# Patient Record
Sex: FEMALE | Race: WHITE
[De-identification: names, ages, dates, MRNs, and addresses within clinical notes are randomized per-mention and may not be internally consistent; named-entity substitution may affect disease eponyms.]

---

## 2022-08-11 ENCOUNTER — HOSPITAL ENCOUNTER (OUTPATIENT)
Dept: HOSPITAL 75 - PREOP | Age: 72
LOS: 5 days | Discharge: HOME | End: 2022-08-16
Attending: ORTHOPAEDIC SURGERY
Payer: MEDICARE

## 2022-08-11 VITALS — HEIGHT: 66.02 IN | BODY MASS INDEX: 20.94 KG/M2 | WEIGHT: 130.29 LBS

## 2022-08-11 DIAGNOSIS — Z01.818: Primary | ICD-10-CM

## 2022-08-11 NOTE — HISTORY AND PHYSICAL
DATE OF SERVICE:  



This will be for outpatient surgery on 08/17/2022 for left wrist hardware

removal.



HISTORY OF PRESENT ILLNESS:

The patient is a 71-year-old right hand dominant female who underwent internal

fixation for left distal radius fracture at an outside facility on or about

06/12/2022.  No medical records were available.  When she presented to the

office, she still had her stitches in.  She had been placed in the cast.  The

radiographs reveal a wrist spanning dorsal plate.  It was recommended the

patient undergo surgical removal because this is joint spanning.



REVIEW OF SYSTEMS:

No chest pain, no shortness of breath, no dysuria.



PAST MEDICAL HISTORY:

Bipolar, tremor, weakness, Alzheimer's, AFib, vitamin D deficiency, anxiety

disorder, major depressive disorder, pain, nausea, dementia, hyperlipidemia.



PAST SURGICAL HISTORY:

Left wrist.



SOCIAL HISTORY:

No alcohol or tobacco use.



FAMILY HISTORY:

Noncontributory.



PRIMARY CARE PROVIDER:

Dr. Chacon.



ALLERGIES:

PENICILLIN AND TYLENOL.



PHYSICAL EXAMINATION:

GENERAL:  The patient is well-developed, well-nourished, in no acute distress.

HEENT:  Normocephalic, atraumatic.  Pupils are equal, round and reactive to

light.  Oropharynx is clear.

NECK:  Supple, no lymphadenopathy.

LUNGS:  Clear to auscultation bilaterally.

HEART:  Regular rate and rhythm.

ABDOMEN:  Soft, nontender, nondistended.

EXTREMITIES:  The left wrist demonstrates well-healed incisions.  She has intact

MCP extension, finger abduction, thumb IP flexion and extension.  Radiographs

reveal markedly comminuted, shortened left distal radius fracture with

well-placed hardware.



IMPRESSION:

Retained hardware, left wrist.



PLAN:

Hardware removal, left wrist.  The risks, benefits, options, ramifications and

recovery were discussed with the patient and her son.  They understand and

wished to proceed.





Job ID: [id number]

DocumentID: [id number]

Dictated Date:  08/01/2022 14:02:46

Transcription Date: 08/01/2022 14:44:45

Dictated By: TARUN HARDWICK MD

## 2022-08-17 ENCOUNTER — HOSPITAL ENCOUNTER (OUTPATIENT)
Dept: HOSPITAL 75 - SDC | Age: 72
Discharge: HOME | End: 2022-08-17
Attending: ORTHOPAEDIC SURGERY
Payer: MEDICARE

## 2022-08-17 VITALS — SYSTOLIC BLOOD PRESSURE: 133 MMHG | DIASTOLIC BLOOD PRESSURE: 86 MMHG

## 2022-08-17 VITALS — SYSTOLIC BLOOD PRESSURE: 138 MMHG | DIASTOLIC BLOOD PRESSURE: 90 MMHG

## 2022-08-17 VITALS — SYSTOLIC BLOOD PRESSURE: 120 MMHG | DIASTOLIC BLOOD PRESSURE: 77 MMHG

## 2022-08-17 VITALS — SYSTOLIC BLOOD PRESSURE: 121 MMHG | DIASTOLIC BLOOD PRESSURE: 72 MMHG

## 2022-08-17 VITALS — SYSTOLIC BLOOD PRESSURE: 154 MMHG | DIASTOLIC BLOOD PRESSURE: 91 MMHG

## 2022-08-17 VITALS — DIASTOLIC BLOOD PRESSURE: 77 MMHG | SYSTOLIC BLOOD PRESSURE: 120 MMHG

## 2022-08-17 VITALS — HEIGHT: 67.01 IN | WEIGHT: 130.29 LBS | BODY MASS INDEX: 20.45 KG/M2

## 2022-08-17 VITALS — SYSTOLIC BLOOD PRESSURE: 152 MMHG | DIASTOLIC BLOOD PRESSURE: 83 MMHG

## 2022-08-17 VITALS — DIASTOLIC BLOOD PRESSURE: 80 MMHG | SYSTOLIC BLOOD PRESSURE: 154 MMHG

## 2022-08-17 VITALS — DIASTOLIC BLOOD PRESSURE: 83 MMHG | SYSTOLIC BLOOD PRESSURE: 151 MMHG

## 2022-08-17 VITALS — DIASTOLIC BLOOD PRESSURE: 89 MMHG | SYSTOLIC BLOOD PRESSURE: 143 MMHG

## 2022-08-17 VITALS — SYSTOLIC BLOOD PRESSURE: 150 MMHG | DIASTOLIC BLOOD PRESSURE: 86 MMHG

## 2022-08-17 VITALS — SYSTOLIC BLOOD PRESSURE: 105 MMHG | DIASTOLIC BLOOD PRESSURE: 66 MMHG

## 2022-08-17 DIAGNOSIS — Z47.2: Primary | ICD-10-CM

## 2022-08-17 PROCEDURE — 87081 CULTURE SCREEN ONLY: CPT

## 2022-08-17 NOTE — PROGRESS NOTE-POST OPERATIVE
Post-Operative Progess Note


Surgeon (s)/Assistant (s)


Surgeon


TARUN HARDWICK MD


Assistant:  Colby Jalloh





Pre-Operative Diagnosis


retained left wrist hardware





Post-Operative Diagnosis





retained left wrist hardware





Procedure & Operative Findings


Date of Procedure


8/17/22


Procedure Performed/Findings


hardware removal left wrist


Anesthesia Type


GETA





Estimated Blood Loss


Estimated blood loss (mL):  minimal





Specimens/Packing


Specimens Removed


plate and screws


Packing:  


none











TARUN HARDWICK MD            Aug 17, 2022 10:16

## 2022-08-17 NOTE — PROGRESS NOTE-PRE OPERATIVE
Pre-Operative Progress Note


Date of Available H&P:  Aug 17, 2022


Date H&P Reviewed:  Aug 17, 2022


Time H&P Reviewed:  07:11


Changes from last HP


none


Pre-Operative Diagnosis:  retained left wrist hardware











TARUN HARDWICK MD            Aug 17, 2022 10:15

## 2022-08-17 NOTE — OPERATIVE REPORT
DATE OF SERVICE:  08/17/2022



PREOPERATIVE DIAGNOSIS:

Symptomatic retained left wrist hardware.



POSTOPERATIVE DIAGNOSIS:

Symptomatic retained left wrist hardware.



PROCEDURE:

Hardware removal, left wrist.



SURGEON:

Alexis Hardwick MD



ASSISTANT:

Colby Jalloh, who assisted throughout the procedure and closed the incisions.



ANESTHESIA:

General endotracheal by Myla Riggins CRNA.



TOURNIQUET TIME:

A 20 minutes at 250 mmHg.



ESTIMATED BLOOD LOSS:

Minimal.



DRAINS:

None.



COMPLICATIONS:

None.



POSTOPERATIVE PLAN:

Splint wear for activities for two weeks.  The patient was transferred to the

recovery room awake and in stable condition.



STATEMENT OF MEDICAL NECESSITY:

The patient is a 71-year-old right hand dominant female who underwent a wrist

spanning plate and screw fixation for distal radius fracture in [place].  She

was moved to a nursing home locally and followed up with us.  The fracture has

gone on to union and because this was a spanning implant, removal was required

in order to progress her activities.



DESCRIPTION OF PROCEDURE:

After risks and benefits of procedure were discussed and questions were

answered, informed consent was signed and placed on chart.  The operative site

was confirmed in the preoperative holding area initialed by the surgeon.  The

patient was then transferred to the operating room and after adequate levels of

general endotracheal anesthetic were obtained, a timeout was called, confirming

the operative site.  Left upper extremity was prepped and draped in the usual

sterile fashion with arm elevated, the tourniquet was inflated to 250 mmHg.  The

previous incisions were utilized over the index metacarpal and distal radius. 

The underlying soft tissues were carefully dissected.  The plate was exposed,

and screws were removed without difficulty.  The plate was then removed without

difficulty.  The wrist was taken through range of motion.  Wounds were copiously

irrigated and closed with 4-0 nylon in horizontal mattress interrupted fashion. 

Incisions were infiltrated with plain Marcaine.  A soft dressing and splint were

applied, and the patient was transferred to the recovery room awake and in

stable condition.





Job ID: [id number]

DocumentID: [id number]

Dictated Date:  08/17/2022 12:40:50

Transcription Date: 08/17/2022 19:09:19

Dictated By: ALEXIS HARDWICK MD

## 2022-08-17 NOTE — ANESTHESIA-GENERAL POST-OP
General


Patient Condition


Mental Status/LOC:  Same as Preop


Cardiovascular:  Satisfactory


Nausea/Vomiting:  Absent


Respiratory:  Satisfactory


Pain:  Controlled


Complications:  Absent





Post Op Complications


Complications


None





Follow Up Care/Instructions


Patient Instructions


None needed.





Anesthesia/Patient Condition


Patient Condition


Patient is doing well, no complaints, stable vital signs, no apparent adverse 

anesthesia problems.   


No complications reported per nursing.











KRISTI BAUTISTA CRNA            Aug 17, 2022 12:39

## 2022-09-18 ENCOUNTER — HOSPITAL ENCOUNTER (EMERGENCY)
Dept: HOSPITAL 75 - ER | Age: 72
Discharge: HOME | End: 2022-09-18
Payer: MEDICARE

## 2022-09-18 VITALS — HEIGHT: 66.93 IN | BODY MASS INDEX: 19.38 KG/M2 | WEIGHT: 123.46 LBS

## 2022-09-18 VITALS — SYSTOLIC BLOOD PRESSURE: 116 MMHG | DIASTOLIC BLOOD PRESSURE: 74 MMHG

## 2022-09-18 DIAGNOSIS — Z28.310: ICD-10-CM

## 2022-09-18 DIAGNOSIS — S30.0XXA: Primary | ICD-10-CM

## 2022-09-18 DIAGNOSIS — Z87.891: ICD-10-CM
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
## 2022-09-18 PROCEDURE — 73521 X-RAY EXAM HIPS BI 2 VIEWS: CPT

## 2022-09-18 NOTE — DIAGNOSTIC IMAGING REPORT
HISTORY: 

Fall, bilateral hip pain.



TECHNIQUE: 

Frontal view of the pelvis. 

Frontal and lateral views of the bilateral hips.



COMPARISON: 

None.



FINDINGS:

No acute fracture or dislocation is seen in the pelvis or

bilateral hips. Alignment appears normal. Joint spaces appear

preserved. There is marked stool in the rectum.



IMPRESSION:

1. No acute osseous abnormality is seen in the pelvis or

bilateral hips.

2. Marked stool in the rectum.



Dictated by: 



  Dictated on workstation # [id number]

## 2022-09-18 NOTE — ED GENERAL
General


Chief Complaint:  Trauma-Non Activation


Stated Complaint:  FALL


Nursing Triage Note:  


ARRIVED VIA EMS FROM [facility] AFTER FALLING.  COMPLAINS OF TAILBONE, 


RIGHT HIP, THIGH, AND KNEE PAIN.


Source of Information:  Patient, Nursing Home Records


Exam Limitations:  Other (Pt has dementia so hx is limited)


 (BINDU LEWIS A MED STUDENT)





History of Present Illness


Date Seen by Provider:  Sep 18, 2022


Time Seen by Provider:  08:49


Initial Comments


Amberly Patiño is a 70 yo female who was brought in via EMS from [facility] after falling. Pt has hx of alzheimers, anxiety, MDD, weakness/debility 

and repeated falls. Pt's history is obtained from [facility] records as 

patient is only alert and oriented to self and place. EMS reported caretakers 

stated the pt fell out of bed this morning. Pt reports she thinks she fell on 

her right hip or buttock. She denies hitting her head or losing consciousness. 

Upon entering room, pt is tearful and states she is "scared" and she is crying 

because this is "silly". Pt states her right hip hurts upon palpation, but at 

rest she reports she is in no pain. She declines pain medication. According to 

nursing home notes she walks with FWW and gait belt with assistance. Unable to 

obtain ROS as pt has hx of dementia and is a poor historian. She does deny any 

other symptoms such as numbness, weakness, urinary frequency or pain, N/V/D.


Timing/Duration:  1-3 Hours


Associated Systoms:  Denies Symptoms (BINDU LEWIS A MED STUDENT)





Allergies and Home Medications


Allergies


Coded Allergies:  


     Penicillins (Verified  Allergy, Unknown, 22)


     acetaminophen (Verified  Allergy, Unknown, 22)





Patient Home Medication List


Home Medication List Reviewed:  Yes


 (YONY COBB MD)


Aspirin (Aspirin) 81 Mg Tab.chew, 81 MG PO DAILY, (Reported)


   Entered as Reported by: GUNNER PEÑA on 22 142


Citalopram Hydrobromide (Celexa) 20 Mg Tablet, 20 MG PO HS, (Reported)


   Entered as Reported by: GUNNER PEÑA on 22 1424


Clonazepam (Clonazepam) 0.25 Mg Tab.rapdis, 0.5 MG PO HS, (Reported)


   Entered as Reported by: GUNNER PEÑA on 22 1602


Melatonin (Melatonin) 3 Mg Capsule, 3 MG PO HS, (Reported)


   Entered as Reported by: GUNNER PEÑA on 22 1424


Mv-Mn/Iron/FA/Herbal Cmplx#190 (Vitamin D3 Complete Caplet) 18 Mg Iron-800 Mcg-

150 Mg Tablet, 1 EACH PO DAILY, (Reported)


   Entered as Reported by: GUNNER PEÑA on 22 1424


Olanzapine (Olanzapine) 5 Mg Tablet, 5 MG PO HS, (Reported)


   Entered as Reported by: GUNNER PEÑA on 22 1424


Ondansetron HCl (Ondansetron HCl) 8 Mg Tablet, 8 MG PO Q8H, (Reported)


   Entered as Reported by: GUNNER PEÑA on 22 1424


Thiamine Mononitrate (Vitamin B-1) 100 Mg Tablet, 100 MG PO DAILY, (Reported)


   Entered as Reported by: GUNNER PEÑA on 22 1424





Review of Systems


Review of Systems


Unable to obtain d/t dementia


 (BINDU LEWIS STUDENT)





Past Medical-Social-Family Hx


Patient Social History


Smoking Status:  Former Smoker


Substance use?:  No


Alcohol Use?:  No


 (BELEW,BINDU A MED STUDENT)





Immunizations Up To Date


First/Initial COVID19 Vaccinat:  YES


 (BINDU LEWIS STUDENT)





Seasonal Allergies


Seasonal Allergies:  No


 (BINDU LEWIS)





Past Medical History


Surgeries:  Yes (LEFT WRIST HARDWARE PLACEMENT, UNKNOWN IF OTHER SX)


Respiratory:  No


Currently Using CPAP:  No


Currently Using BIPAP:  No


Cardiac:  Yes


Atrial Fibrillation, High Cholesterol


Neurological:  Yes (ALZHEIMERS, TREMOR, COGNATIVE COMMNICATION DEFICIT)


Dementia


Genitourinary:  No


Gastrointestinal:  Yes (ARTIFICAL GI TRACCT, NAUSEA)


Musculoskeletal:  Yes (WEAKNESS, DIFFICULTY WALKING)


Fractures


Endocrine:  No


HEENT:  No (UNKNOWN)


Cancer:  No (UNKNOWN)


Psychosocial:  Yes (DEPRESSIVE DISORDER,)


Anxiety


Integumentary:  No


 (BELEW,BINDU A MED STUDENT)





Physical Exam


Vital Signs





Vital Signs - First Documented








 22





 08:33


 


Temp 36.6


 


Pulse 79


 


Resp 16


 


B/P (MAP) 112/69 (83)


 


Pulse Ox 95


 


O2 Delivery Room Air





 (YONY COBB MD)


Vital Signs


Capillary Refill : Less Than 3 Seconds 


 (BINDU LEWIS MED STUDENT)


Height, Weight, BMI


Height: '"


Weight: lbs. oz. kg; 19.00 BMI


Method:


 


 (BINDU LEWIS STUDENT)


General Appearance:  Anxious (tearful), Thin


Eyes:  Bilateral Eye Normal Inspection, Bilateral Eye PERRL, Bilateral Eye EOMI


HEENT:  PERRL/EOMI


Neck:  Full Range of Motion, Non Tender


Respiratory:  Lungs Clear, Normal Breath Sounds, No Accessory Muscle Use, No 

Respiratory Distress


Cardiovascular:  Regular Rate, Rhythm, Normal Peripheral Pulses


Gastrointestinal:  Non Tender, Soft (YONY COBB MD)





Progress/Results/Core Measures


Suspected Sepsis


SIRS


Temperature: 


Pulse: 79 


Respiratory Rate: 16


 


Blood Pressure 112 /69 


Mean: 83


 


 (BINDU LEWIS STUDENT)





Results/Orders


My Orders





Orders - YONY COBB MD


Pelvis/Tammie Hips 2 Views (22 08:48)


Hydrocodone/Apap 7.5/325 Tab (Lortab 7. (22 09:00)


 (YONY COBB MD)


Medications Given in ED





Current Medications








 Medications  Dose


 Ordered  Sig/Rolando


 Route  Start Time


 Stop Time Status Last Admin


Dose Admin


 


 Acetaminophen/


 Hydrocodone Bitart  1 ea  ONCE  ONCE


 PO  22 09:00


 22 09:02 DC 22 09:04


1 EA





 (YONY COBB MD)


Vital Signs/I&O











 22





 08:33


 


Temp 36.6


 


Pulse 79


 


Resp 16


 


B/P (MAP) 112/69 (83)


 


Pulse Ox 95


 


O2 Delivery Room Air





 (YONY COBB MD)


Vital Signs/I&O


Capillary Refill : Less Than 3 Seconds 


 (BINDU LEWIS MED STUDENT)


2





Blood Pressure Mean:                    83











Diagnostic Imaging





   Diagonstic Imaging:  Xray


Comments


                 [facility] [place], [place]





NAME:   AMBERLY PATIÑO


MED REC#:   [id number]


ACCOUNT#:   [id number]


PT STATUS:   REG ER


:   1950


PHYSICIAN:   YONY COBB MD


ADMIT DATE:   22/ER


                                   ***Draft***


Date of Exam:22





PELVIS/TAMMIE HIPS 2 VIEWS








HISTORY: 


Fall, bilateral hip pain.





TECHNIQUE: 


Frontal view of the pelvis. 


Frontal and lateral views of the bilateral hips.





COMPARISON: 


None.





FINDINGS:


No acute fracture or dislocation is seen in the pelvis or


bilateral hips. Alignment appears normal. Joint spaces appear


preserved. There is marked stool in the rectum.





IMPRESSION:


1. No acute osseous abnormality is seen in the pelvis or


bilateral hips.


2. Marked stool in the rectum.





  Dictated on workstation # [id number]








Dict:   22 1004


Trans:   22 1012


 [id number]





Interpreted by:     DIANA TRIMBLE MD


Electronically signed by:  


 (YONY COBB MD)





Departure


Impression





   Primary Impression:  


   Fall


   Qualified Codes:  W19.XXXA - Unspecified fall, initial encounter


   Additional Impression:  


   Pelvic contusion


   Qualified Codes:  S30.0XXA - Contusion of lower back and pelvis, initial 

   encounter


Disposition:  01 HOME, SELF-CARE


Condition:  Stable





Departure-Patient Inst.


Decision time for Depature:  10:25


 (YONY COBB MD)


Referrals:  


UZMA CROSS MD (PCP/Family)


Primary Care Physician


Patient Instructions:  Minor Contusion ED





Add. Discharge Instructions:  


No hip or pelvic fracture was discovered today.  





Continue routine medications as prescribed.





Monitor for increasing weakness.  Fall prevention is important - she needs 

assist with ambulation.





Return to the emergency department for any new, concerning or emergent complain

ts





Copy


Copies To 1:   UZMA CROSS MD, [place] A MED STUDENT    Sep 18, 2022 08:56


YONY COBB MD         Sep 18, 2022 10:09

## 2022-10-02 ENCOUNTER — HOSPITAL ENCOUNTER (EMERGENCY)
Dept: HOSPITAL 75 - ER | Age: 72
Discharge: HOME | End: 2022-10-02
Payer: MEDICARE

## 2022-10-02 VITALS — BODY MASS INDEX: 21.11 KG/M2 | WEIGHT: 134.48 LBS | HEIGHT: 66.93 IN

## 2022-10-02 VITALS — DIASTOLIC BLOOD PRESSURE: 78 MMHG | SYSTOLIC BLOOD PRESSURE: 128 MMHG
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
## 2022-10-02 DIAGNOSIS — S01.81XA: ICD-10-CM

## 2022-10-02 DIAGNOSIS — S09.90XA: Primary | ICD-10-CM

## 2022-10-02 DIAGNOSIS — Z23: ICD-10-CM

## 2022-10-02 DIAGNOSIS — Z87.891: ICD-10-CM

## 2022-10-02 PROCEDURE — 70450 CT HEAD/BRAIN W/O DYE: CPT

## 2022-10-02 PROCEDURE — 12011 RPR F/E/E/N/L/M 2.5 CM/<: CPT

## 2022-10-02 PROCEDURE — 72125 CT NECK SPINE W/O DYE: CPT

## 2022-10-02 PROCEDURE — 90715 TDAP VACCINE 7 YRS/> IM: CPT

## 2022-10-02 NOTE — DIAGNOSTIC IMAGING REPORT
PROCEDURE: CT head and CT cervical spine without contrast.



TECHNIQUE: Multiple contiguous axial images were obtained through

the brain and cervical spine without the use of intravenous

contrast. Sagittal and coronal reformations through the cervical

spine were then performed. Auto Exposure Controls were utilized

during the CT exam to meet ALARA standards for radiation dose

reduction. 



INDICATION: Fall. Head pain. Laceration above right eye.



COMPARISON: CT head September 18, 2021.



FINDINGS: There is age-related global volume loss. There are

background microvascular changes present within the white matter.

There is no finding of acute intracranial hemorrhage. There is no

evidence of an abnormal extra-axial collection. There is no mass

effect or shift. There is no hydrocephalus. The basilar cisterns

are patent.



There is no finding of territorial loss of gray-white

differentiation. There is no vasogenic edema.



There is no CT finding of a calvarial fracture. The mastoids are

clear. The paranasal sinuses are clear. The orbital contents are

unremarkable. There is no identified facial fracture.



Cervical spine demonstrates reversal of the cervical lordosis.

Alignment is normal. There are normal relationships of the

craniocervical junction. There are normal relationships of the

lateral masses of C1 and C2. The facets are normally aligned.

There is no facet joint or disc space widening. Vertebral body

heights are maintained. There is no finding of an acute cervical

spine fracture. There is no evidence of high-grade canal

stenosis.



The lung apices demonstrate features of paraseptal emphysema.

There is a markedly enlarged left sided thyroid which appears

multinodular. There is no acute soft tissue abnormality.



IMPRESSION:

1. Age-related volume loss with background microvascular changes

within the white matter. There is no CT finding of intracranial

hemorrhage or of an acute intracranial abnormality.

2. No identified facial or calvarial fracture. Orbital contents

unremarkable.

3. No CT finding of cervical spine fracture or traumatic

malalignment.

4. Paraseptal emphysema.

5. Marked enlargement of a multinodular left lobe of the thyroid.



Dictated by: 



  Dictated on workstation # [id number]

## 2022-10-02 NOTE — ED FALL/INJURY
General


Chief Complaint:  Laceration


Stated Complaint:  FALL


Nursing Triage Note:  


ARRIVED VIA EMS FROM FACILITY WITH COMPLAINTS OF FALLING AND CAUSING A 


LACERATION ABOVE HER RIGHT EYE.  DENIES LOC.  STATES SHE JUST TRIPPED.  EMS 


REPORTS SHE WAS NOT USING HER WALKER.





History of Present Illness


Date Seen by Provider:  Oct 2, 2022


Time Seen by Provider:  15:20


Initial Comments


Patient is a 71 yo F who presents to the ED via EMS after a mechanical fall at 

her assisted living facility. She states she was walking in front of the 

refrigerator when she slipped causing her to fall and strike the right side of 

her forehead/head on the ground. She denies any LOC. States her only pain is 

that in her head. EMS state patient typically uses a walker to assist with 

ambulation but was not using it at the time of the fall. Patient states she 

noticed blood coming from her forehead and she applied a rag to wound and held 

pressure. EMS states patient was stable en route. Patient is unsure of the date 

of her last tetanus booster.


Occurred:  just prior to arrival


Injuries/Pain Location:  head, face


Context:  slipped


Loss of Consciousness:  no loss of consciousness





Allergies and Home Medications


Allergies


Coded Allergies:  


     Penicillins (Verified  Allergy, Unknown, 8/16/22)


     acetaminophen (Verified  Allergy, Unknown, 8/16/22)





Patient Home Medication List


Home Medication List Reviewed:  Yes


Aspirin (Aspirin) 81 Mg Tab.chew, 81 MG PO DAILY, (Reported)


   Entered as Reported by: GUNNER PEÑA on 8/16/22 1424


Citalopram Hydrobromide (Celexa) 20 Mg Tablet, 20 MG PO HS, (Reported)


   Entered as Reported by: GUNNER PEÑA on 8/16/22 1424


Clonazepam (Clonazepam) 0.25 Mg Tab.rapdis, 0.5 MG PO HS, (Reported)


   Entered as Reported by: GUNNER PEÑA on 8/11/22 1602


Melatonin (Melatonin) 3 Mg Capsule, 3 MG PO HS, (Reported)


   Entered as Reported by: GUNNER PEÑA on 8/16/22 1424


Mv-Mn/Iron/FA/Herbal Cmplx#190 (Vitamin D3 Complete Caplet) 18 Mg Iron-800 Mcg-

150 Mg Tablet, 1 EACH PO DAILY, (Reported)


   Entered as Reported by: GUNNER PEÑA on 8/16/22 1424


Olanzapine (Olanzapine) 5 Mg Tablet, 5 MG PO HS, (Reported)


   Entered as Reported by: GUNNER PEÑA on 8/16/22 1424


Ondansetron HCl (Ondansetron HCl) 8 Mg Tablet, 8 MG PO Q8H, (Reported)


   Entered as Reported by: GUNNER PEÑA on 8/16/22 1424


Thiamine Mononitrate (Vitamin B-1) 100 Mg Tablet, 100 MG PO DAILY, (Reported)


   Entered as Reported by: GUNNER PEÑA on 8/16/22 1424





Review of Systems


Review of Systems


Constitutional:  no symptoms reported


Eyes:  No Symptoms Reported


Ears, Nose, Mouth, Throat:  no symptoms reported


Respiratory:  no symptoms reported


Cardiovascular:  no symptoms reported


Gastrointestinal:  no symptoms reported


Genitourinary:  no symptoms reported


Skin:  other (laceration)


Psychiatric/Neurological:  Headache





Past Medical-Social-Family Hx


Patient Social History


Tobacco Use?:  Yes


Smoking Status:  Former Smoker


Substance use?:  Yes


Substance frequency:  Rarely





Immunizations Up To Date


First/Initial COVID19 Vaccinat:  YES





Seasonal Allergies


Seasonal Allergies:  No





Past Medical History


Surgeries:  Yes (LEFT WRIST HARDWARE PLACEMENT, UNKNOWN IF OTHER SX)


Respiratory:  No


Currently Using CPAP:  No


Currently Using BIPAP:  No


Cardiac:  Yes


Atrial Fibrillation, High Cholesterol


Neurological:  Yes (ALZHEIMERS, TREMOR, COGNATIVE COMMNICATION DEFICIT)


Dementia


Genitourinary:  No


Gastrointestinal:  Yes (ARTIFICAL GI TRACCT, NAUSEA)


Musculoskeletal:  Yes (WEAKNESS, DIFFICULTY WALKING)


Fractures


Endocrine:  No


HEENT:  No (UNKNOWN)


Cancer:  No (UNKNOWN)


Psychosocial:  Yes (DEPRESSIVE DISORDER,)


Anxiety


Integumentary:  No





Physical Exam


Vital Signs





Vital Signs - First Documented








 10/2/22





 15:18


 


Temp 36.4


 


Pulse 91


 


Resp 16


 


B/P (MAP) 124/74 (91)


 


Pulse Ox 97


 


O2 Delivery Room Air





Capillary Refill : Less Than 3 Seconds


Height, Weight, BMI


Height: '"


Weight: lbs. oz. kg; 21.00 BMI


Method:


General Appearance:  WD/WN


HEENT:  PERRL/EOMI, normal ENT inspection, TMs normal


Neck:  non-tender, full range of motion, supple, normal inspection


Cardiovascular:  regular rate, rhythm


Respiratory:  chest non-tender, lungs clear, normal breath sounds, no respira

tory distress, no accessory muscle use


Gastrointestinal:  normal bowel sounds, non tender, soft


Extremities:  normal range of motion, non-tender, normal inspection


Neurologic/Psychiatric:  no motor/sensory deficits, alert, normal mood/affect


Skin:  normal color, warm/dry





[place] Coma Score


Best Eye Response:  (4) Open Spontaneously


Best Verbal Response:  (4) Confused Conversation


Best Motor Response:  (6) Obeys Commands


[place] Total:  14





Procedures/Interventions





   Wound Location:  Face


   Wound Length (cm):  1.5


   Wound's Depth, Shape:  superficial


   Wound Explored:  no foreign body removed


   Irrigated w/ Saline (ccs):  100


   Suture Size:  5-0


   Number of Sutures:  5


anesthesia with LET gel; 5 sutures of fast absorbing gut applied to the wound





Progress/Results/Core Measures


Results/Orders


My Orders





Orders - ANA PAULA BURKS


Ct Head/Cervical Spine Wo (10/2/22 15:26)


Let Solution (Let Solution) (10/2/22 15:30)


Dipht,Pertuss(Acell),Tet Adult (Boostrix (10/2/22 15:30)





Medications Given in ED





Current Medications








 Medications  Dose


 Ordered  Sig/Rolando


 Route  Start Time


 Stop Time Status Last Admin


Dose Admin


 


 Diphtheria/


 Tetanus/Acell


 Pertussis  0.5 ml  ONCE ONCE


 IM  10/2/22 15:30


 10/2/22 15:31 DC 10/2/22 16:19


0.5 ML


 


 Tetracaine/


 Epinephrine/


 Lidocaine  3 ml  ONCE  ONCE


 TOP  10/2/22 15:30


 10/2/22 15:31 DC 10/2/22 15:31


3 ML








Vital Signs/I&O











 10/2/22 10/2/22





 15:18 17:03


 


Temp 36.4 


 


Pulse 91 81


 


Resp 16 16


 


B/P (MAP) 124/74 (91) 128/78


 


Pulse Ox 97 99


 


O2 Delivery Room Air Room Air














Blood Pressure Mean:                    91











Progress


Progress Note :  


Progress Note


Patient is nontoxic and well hydrated on exam. No focal neurologic deficits 

appreciated. Vital signs are reassuring. Pt is alert to person and place. This 

is her baseline per EMS and family. CT of the head/c-spine was negative. Pt was 

placed in a c-collar by EMS and this was successfully removed. Laceration was 

repaired as noted separately. Tetanus was updated. Wound care was discussed. 

Follow-up with PCP as needed. Return precautions for urgent symptomology 

discussed. Patient and family verbalized understanding.





Departure


Impression





   Primary Impression:  


   Minor head injury without loss of consciousness


   Qualified Codes:  S09.90XA - Unspecified injury of head, initial encounter


   Additional Impressions:  


   Fall


   Qualified Codes:  W19.XXXA - Unspecified fall, initial encounter


   Forehead laceration


   Qualified Codes:  S01.81XA - Laceration without foreign body of other part of

   head, initial encounter


Disposition:  01 HOME, SELF-CARE


Condition:  Improved





Departure-Patient Inst.


Decision time for Depature:  16:50


Referrals:  


UZMA CROSS MD (PCP/Family)


Primary Care Physician


Patient Instructions:  Laceration Repair With Stitches (DC), Minor Head Injury 

(DC)











ANA PAULA BURKS              Oct 2, 2022 16:01

## 2023-02-01 ENCOUNTER — HOSPITAL ENCOUNTER (EMERGENCY)
Dept: HOSPITAL 75 - ER | Age: 73
Discharge: HOME | End: 2023-02-01
Payer: MEDICARE

## 2023-02-01 VITALS — SYSTOLIC BLOOD PRESSURE: 128 MMHG | DIASTOLIC BLOOD PRESSURE: 74 MMHG
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
## 2023-02-01 DIAGNOSIS — S50.11XA: Primary | ICD-10-CM

## 2023-02-01 PROCEDURE — 73090 X-RAY EXAM OF FOREARM: CPT

## 2023-02-01 NOTE — ED UPPER EXTREMITY
General


Chief Complaint:  Trauma-Non Activation


Stated Complaint:  FALL | RT ARM INJ


Source:  patient, caregiver


Exam Limitations:  no limitations





History of Present Illness


Date Seen by Provider:  Feb 1, 2023


Time Seen by Provider:  15:00


Initial Comments


Patient is a 72-year-old female who presents to the emergency department for 

evaluation of right arm pain after a mechanical fall in her kitchen.  Patient is

accompanied by a caregiver who helps provide history.  Patient denies any other 

pain or injury at this time.  She states she remembers the fall and denies any 

head injury or loss of consciousness.  The injury occurred approximately 1 hour 

prior to arrival.  Ice was placed to the injury shortly after it occurred.  

Patient has had no medications for the symptoms since they began.





Allergies and Home Medications


Allergies


Coded Allergies:  


     Penicillins (Verified  Allergy, Unknown, 8/16/22)


     acetaminophen (Verified  Allergy, Unknown, 8/16/22)





Patient Home Medication List


Home Medication List Reviewed:  Yes


Aspirin (Aspirin) 81 Mg Tab.chew, 81 MG PO DAILY, (Reported)


   Entered as Reported by: GUNNER PEÑA on 8/16/22 1424


Citalopram Hydrobromide (Celexa) 20 Mg Tablet, 20 MG PO HS, (Reported)


   Entered as Reported by: GUNNER PEÑA on 8/16/22 1424


Clonazepam (Clonazepam) 0.25 Mg Tab.rapdis, 0.5 MG PO HS, (Reported)


   Entered as Reported by: GUNNER PEÑA on 8/11/22 1602


Melatonin (Melatonin) 3 Mg Capsule, 3 MG PO HS, (Reported)


   Entered as Reported by: GUNNER PEÑA on 8/16/22 1424


Mv-Mn/Iron/FA/Herbal Cmplx#190 (Vitamin D3 Complete Caplet) 18 Mg Iron-800 Mcg-

150 Mg Tablet, 1 EACH PO DAILY, (Reported)


   Entered as Reported by: GUNNER PEÑA on 8/16/22 1424


Olanzapine (Olanzapine) 5 Mg Tablet, 5 MG PO HS, (Reported)


   Entered as Reported by: GUNNER PEÑA on 8/16/22 1424


Ondansetron HCl (Ondansetron HCl) 8 Mg Tablet, 8 MG PO Q8H, (Reported)


   Entered as Reported by: GUNNER PEÑA on 8/16/22 1424


Thiamine Mononitrate (Vitamin B-1) 100 Mg Tablet, 100 MG PO DAILY, (Reported)


   Entered as Reported by: GUNNER PEÑA on 8/16/22 9144





Review of Systems


Constitutional:  no symptoms reported


EENTM:  no symptoms reported


Respiratory:  no symptoms reported


Cardiovascular:  no symptoms reported


Gastrointestinal:  no symptoms reported


Genitourinary:  no symptoms reported


Musculoskeletal:  see HPI


Skin:  no symptoms reported


Psychiatric/Neurological:  No Symptoms Reported





Past Medical-Social-Family Hx


Immunizations Up To Date


First/Initial COVID19 Vaccinat:  YES





Seasonal Allergies


Seasonal Allergies:  No





Past Medical History


Surgeries:  Yes (LEFT WRIST HARDWARE PLACEMENT, UNKNOWN IF OTHER SX)


Respiratory:  No


Currently Using CPAP:  No


Currently Using BIPAP:  No


Cardiac:  Yes


Atrial Fibrillation, High Cholesterol


Neurological:  Yes (ALZHEIMERS, TREMOR, COGNATIVE COMMNICATION DEFICIT)


Dementia


Genitourinary:  No


Gastrointestinal:  Yes (ARTIFICAL GI TRACCT, NAUSEA)


Musculoskeletal:  Yes (WEAKNESS, DIFFICULTY WALKING)


Fractures


Endocrine:  No


HEENT:  No (UNKNOWN)


Cancer:  No (UNKNOWN)


Psychosocial:  Yes (DEPRESSIVE DISORDER,)


Anxiety


Integumentary:  No





Physical Exam


Vital Signs





Vital Signs - First Documented








 2/1/23





 14:54


 


Temp 36.0


 


Pulse 112


 


Resp 22


 


B/P (MAP) 140/77 (98)


 


Pulse Ox 96


 


O2 Delivery Room Air





Capillary Refill :


Height, Weight, BMI


Height: '"


Weight: lbs. oz. kg; 21.00 BMI


Method:


General Appearance:  WD/WN, no apparent distress


HEENT:  PERRL/EOMI, normal ENT inspection, TMs normal, pharynx normal


Neck:  non-tender, full range of motion, supple, normal inspection


Cardiovascular:  regular rate, rhythm


Respiratory:  chest non-tender, lungs clear, normal breath sounds, no 

respiratory distress, no accessory muscle use


Gastrointestinal:  normal bowel sounds, non tender, soft


Elbow/Forearm:  Right, pain, soft tissue tenderness, swelling





Procedures/Interventions





   Suture Size:  5-0





Progress/Results/Core Measures


Results/Orders


My Orders





Orders - ANA PAULA BURKS


Forearm, Right, 2 Views (2/1/23 14:58)





Vital Signs/I&O











 2/1/23





 14:54


 


Temp 36.0


 


Pulse 112


 


Resp 22


 


B/P (MAP) 140/77 (98)


 


Pulse Ox 96


 


O2 Delivery Room Air











Progress


Progress Note :  


Progress Note


Patient is nontoxic and well-hydrated on exam.  She was ambulatory to the room 

without issue.  She does have some swelling noted to the radial side of the mid 

forearm along with ecchymosis.  She is able to pronate and supinate the affected

extremity without provocation of pain.  Neurovascular function is intact distal 

to the injury.  Ulnar and radial pulse are strong and brisk.





Placed for x-ray of the right forearm.





No obvious osseous abnormality noted on my wet read of the x-ray.  Formal 

radiology report in agreement that there are no acute findings noted.  Will 

discharge home with recommendations for supportive care and follow-up with PCP. 

Return precautions for urgent symptomology discussed.  Patient and caregiver 

verbalized understanding.





Departure


Impression





   Primary Impression:  


   Contusion of right forearm


   Qualified Codes:  S50.11XA - Contusion of right forearm, initial encounter


Disposition:  01 HOME, SELF-CARE


Condition:  Stable





Departure-Patient Inst.


Decision time for Depature:  15:35


Referrals:  


DENISE OLEARY DO (PCP/Family)


Primary Care Physician


Patient Instructions:  Contusion (DC)











ANA PAULA BURKS              Feb 1, 2023 15:03

## 2023-02-01 NOTE — DIAGNOSTIC IMAGING REPORT
INDICATION: Fall with right forearm pain.



AP and lateral views of the right forearm are obtained.



No fracture or acute bony abnormality seen.



IMPRESSION:



Negative right forearm.



Dictated by: 



  Dictated on workstation # [id number]

## 2023-07-01 ENCOUNTER — HOSPITAL ENCOUNTER (EMERGENCY)
Dept: HOSPITAL 75 - ER | Age: 73
Discharge: HOME | End: 2023-07-01
Payer: MEDICARE

## 2023-07-01 VITALS — SYSTOLIC BLOOD PRESSURE: 127 MMHG | DIASTOLIC BLOOD PRESSURE: 70 MMHG

## 2023-07-01 VITALS — HEIGHT: 66.14 IN | WEIGHT: 135.8 LBS | BODY MASS INDEX: 21.83 KG/M2

## 2023-07-01 DIAGNOSIS — S60.042A: ICD-10-CM

## 2023-07-01 DIAGNOSIS — S06.2X0A: Primary | ICD-10-CM
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
## 2023-07-01 PROCEDURE — 70450 CT HEAD/BRAIN W/O DYE: CPT

## 2023-07-01 NOTE — ED FALL/INJURY
General


Chief Complaint:  Trauma-Non Activation


Stated Complaint:  FALL


Source:  patient, EMS


Exam Limitations:  no limitations





History of Present Illness


Date Seen by Provider:  Jul 1, 2023


Time Seen by Provider:  07:05


Initial Comments


72-year-old female presents to the emergency department today after a fall.  She

was at her nursing facility and had an unwitnessed fall.  She does not believe 

she lost consciousness.  She is not on blood thinning medications.  She got up 

and was able to ambulate without difficulty after the event.  She complains of 

pain only in her left head at the area of the bruise.





All other systems reviewed and negative except documented per HPI.





Voice recognition software was used to help create this chart





Allergies and Home Medications


Allergies


Coded Allergies:  


     Penicillins (Verified  Allergy, Unknown, 8/16/22)


     acetaminophen (Verified  Allergy, Unknown, 8/16/22)





Patient Home Medication List


Home Medication List Reviewed:  Yes


Aspirin (Aspirin) 81 Mg Tab.chew, 81 MG PO DAILY, (Reported)


   Entered as Reported by: GUNNER PEÑA on 8/16/22 1424


Citalopram Hydrobromide (Celexa) 20 Mg Tablet, 20 MG PO HS, (Reported)


   Entered as Reported by: GUNNER PEÑA on 8/16/22 1424


Clonazepam (Clonazepam) 0.25 Mg Tab.rapdis, 0.5 MG PO HS, (Reported)


   Entered as Reported by: GUNNER PEÑA on 8/11/22 1602


Melatonin (Melatonin) 3 Mg Capsule, 3 MG PO HS, (Reported)


   Entered as Reported by: GUNNER PEÑA on 8/16/22 1424


Mv-Mn/Iron/FA/Herbal Cmplx#190 (Vitamin D3 Complete Caplet) 18 Mg Iron-800 Mcg-

150 Mg Tablet, 1 EACH PO DAILY, (Reported)


   Entered as Reported by: GUNNER PEÑA on 8/16/22 1424


Olanzapine (Olanzapine) 5 Mg Tablet, 5 MG PO HS, (Reported)


   Entered as Reported by: GUNNER PEÑA on 8/16/22 1424


Ondansetron HCl (Ondansetron HCl) 8 Mg Tablet, 8 MG PO Q8H, (Reported)


   Entered as Reported by: GUNNER PEÑA on 8/16/22 1424


Thiamine Mononitrate (Vitamin B-1) 100 Mg Tablet, 100 MG PO DAILY, (Reported)


   Entered as Reported by: GUNNER PEÑA on 8/16/22 1424





Review of Systems


Review of Systems


Constitutional:  see HPI





Past Medical-Social-Family Hx


Patient Social History


Tobacco Use?:  No


Use of E-Cig and/or Vaping dev:  No


Substance use?:  No


Alcohol Use?:  No





Immunizations Up To Date


First/Initial COVID19 Vaccinat:  YES


Second COVID19 Vaccination Florentin:  YES


Third COVID19 Vaccination Date:  YES





Seasonal Allergies


Seasonal Allergies:  No





Past Medical History


Surgery/Hospitalization HX:  


afib


Surgeries:  Yes (LEFT WRIST HARDWARE PLACEMENT, UNKNOWN IF OTHER SX)


Respiratory:  No


Currently Using CPAP:  No


Currently Using BIPAP:  No


Cardiac:  Yes


Atrial Fibrillation, High Cholesterol


Neurological:  Yes (ALZHEIMERS, TREMOR, COGNATIVE COMMNICATION DEFICIT)


Dementia


Genitourinary:  No


Gastrointestinal:  Yes (ARTIFICAL GI TRACCT, NAUSEA)


Musculoskeletal:  Yes (WEAKNESS, DIFFICULTY WALKING)


Fractures


Endocrine:  No


HEENT:  No (UNKNOWN)


Cancer:  No (UNKNOWN)


Psychosocial:  Yes (DEPRESSIVE DISORDER,)


Anxiety


Integumentary:  No





Physical Exam


Vital Signs





Vital Signs - First Documented








 7/1/23





 07:10


 


Temp 36.5


 


Pulse 67


 


Resp 18


 


B/P (MAP) 122/75 (91)


 


O2 Delivery Room Air





Capillary Refill :


Height, Weight, BMI


Height: '"


Weight: lbs. oz. kg; 21.00 BMI


Method:


General Appearance:  WD/WN, no apparent distress


HEENT:  PERRL/EOMI, pharynx normal, other (Cephalhematoma above her left eye)


Neck:  non-tender, supple


Cardiovascular:  regular rate, rhythm, no murmur


Respiratory:  chest non-tender, lungs clear, normal breath sounds, no 

respiratory distress, no accessory muscle use


Gastrointestinal:  normal bowel sounds, non tender, soft, no organomegaly


Extremities:  normal range of motion, non-tender, no pedal edema, no calf 

tenderness, other (Bruising just beneath the MCP on the dorsum of the left hand 

beneath the fourth digit.  There is no bony tenderness.)


Neurologic/Psychiatric:  CNs II-XII nml as tested, no motor/sensory deficits, 

alert, normal mood/affect, oriented x 3


Skin:  warm/dry, ecchymosis (Above left eye)





Procedures/Interventions





   Suture Size:  5-0





Progress/Results/Core Measures


Results/Orders


My Orders





Orders - LUIS ANTONIO SHELDON DO


Ct Head Wo (7/1/23 07:12)





Vital Signs/I&O











 7/1/23





 07:10


 


Temp 36.5


 


Pulse 67


 


Resp 18


 


B/P (MAP) 122/75 (91)


 


O2 Delivery Room Air











Departure


Communication (Admissions)


Patient is hemodynamically stable, alert, oriented.  CT scan obtained of her 

head due to significant trauma and her age.  This was negative on my independent

review.  She has no focal neurologic deficits.  She does have some mild bruising

to the dorsum of her left hand but no bony tenderness whatsoever.  She is 

discharged home in stable condition with supportive care.





Impression





   Primary Impression:  


   Fall


   Qualified Codes:  W19.XXXA - Unspecified fall, initial encounter


   Additional Impression:  


   Cephalhematoma


Disposition:  01 HOME, SELF-CARE


Condition:  Stable





Departure-Patient Inst.


Referrals:  


DENISE OLEARY DO (PCP/Family)


Primary Care Physician


Patient Instructions:  Preventing Falls ED





Add. Discharge Instructions:  


Your CT scan is normal.  You have no obvious serious injuries from your fall.  

Use ibuprofen and Tylenol for pain.  Return to the emergency department for any 

severe concerns





All discharge instructions reviewed with patient and/or family. Voiced 

understanding.











LUIS ANTONIO SHELDON DO             Jul 1, 2023 07:15

## 2023-07-01 NOTE — DIAGNOSTIC IMAGING REPORT
PROCEDURE: CT head without contrast.



TECHNIQUE: Multiple contiguous axial images were obtained through

the brain without the use of intravenous contrast. Auto Exposure

Controls were utilized during the CT exam to meet ALARA standards

for radiation dose reduction. 



INDICATION:  Headache after fall



COMPARISON: CT of the head. 2022.



FINDINGS:

Mild generalized cerebral volume loss. Mild nonspecific

periventricular hypoattenuation.. No intra- or extra-axial mass

or fluid collection. No acute hemorrhage. The ventricles are

normal in size, shape, and morphology. The gray-white matter

junction is normal. The subarachnoid cisterns are patent. 



The visualized paranasal sinuses are normal.  The visualized

portions of the orbits and globes are normal. The mastoid air

cells are clear. 



Left frontal scalp trauma. The  topogram shows no lytic

lesion or fracture. 



Impression:



No acute intracranial process.



Dictated by: 



  Dictated on workstation # [id number]